# Patient Record
(demographics unavailable — no encounter records)

---

## 2025-04-03 NOTE — HISTORY OF PRESENT ILLNESS
[FreeTextEntry1] : Patient is a 47-year-old male with recently diagnosed hypertension and hyperlipidemia during a hospital visit about a month ago.  The patient states that he was at work and got really sweaty and was not feeling well, recommended by his coworkers to go to the hospital where he got evaluated.  He was ruled out for acute MI as per the patient and was diagnosed with hypertension, was started on atorvastatin 40 mg daily, aspirin 81 mg daily and losartan 25 mg daily.  He has not been able to get an appointment with primary care physician and hence is here to establish care and get medication refill.  The patient otherwise denies any more episodes similar like that 1 episode, denies any chest pain, shortness of breath, orthopnea, PND, lower extremity edema.

## 2025-04-03 NOTE — ASSESSMENT
[FreeTextEntry1] : Patient is a 47-year-old female with recent hospital visit about a month ago for sweaty spell, found to have uncontrolled hypertension, following which she was started on treatment for hypertension hyperlipidemia.  Has been on losartan 25 mg daily and atorvastatin 40 mg daily until a few days ago till he ran out of his medication and hence made a visit today.  He is not endorsing any signs and symptoms of angina or heart failure, euvolemic on exam, no murmurs appreciated, electrocardiogram shows normal sinus rhythm with no ischemia or infarction.  Hypertension Mildly above goal today, was not taking the losartan as he ran out Continue with 25 mg daily of losartan Will follow-up on labs from the hospital as well as echocardiogram  Hyperlipidemia Continue with atorvastatin 40 mg daily, lipid profile not available for my review, will request records from the hospital  The patient will see me back in 2 months, sooner if needed

## 2025-04-03 NOTE — RESULTS/DATA
[TextEntry] : Labs reviewed 2/23/2025  HDL 41 Triglycerides 212 Total cholesterol 261 NT proBNP 45 Hemoglobin 16.3 Glucose 89 BUN 11 Creatinine 1.17 Sodium 140 Potassium 4.5

## 2025-04-03 NOTE — REVIEW OF SYSTEMS
[Fever] : no fever [Headache] : no headache [Chills] : no chills [Feeling Fatigued] : not feeling fatigued [Blurry Vision] : no blurred vision [SOB] : no shortness of breath [Leg Claudication] : no intermittent leg claudication [Syncope] : no syncope [Cough] : no cough [Wheezing] : no wheezing [Dizziness] : no dizziness

## 2025-07-23 NOTE — HISTORY OF PRESENT ILLNESS
[FreeTextEntry1] : Patient is a 47-year-old male with recently diagnosed hypertension and hyperlipidemia here for follow-up visit.  Patient states that he has been doing well.  He is between jobs, denies any chest pain, shortness of breath, orthopnea, PND.  He denies any nausea, vomiting.  He is active with no limitation to his activities.

## 2025-07-23 NOTE — ASSESSMENT
[FreeTextEntry1] : Patient is a 47-year-old male who presents to the cardiology clinic for follow-up visit.  He was diagnosed with hypertension and hyperlipidemia in March 2025.  He has been taking his medications and doing well with no issues.  Hypertension Blood pressure at goal, continue with 25 mg daily of losartan.  Hyperlipidemia Continue with atorvastatin 40 mg daily, lipid profile not available for my review, will follow-up in 6 months on lipid panel  The patient will see me back in 6 months, sooner if needed